# Patient Record
Sex: MALE
[De-identification: names, ages, dates, MRNs, and addresses within clinical notes are randomized per-mention and may not be internally consistent; named-entity substitution may affect disease eponyms.]

---
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
## 2020-02-03 ENCOUNTER — RX RENEWAL (OUTPATIENT)
Age: 20
End: 2020-02-03

## 2020-07-26 ENCOUNTER — RESULT CHARGE (OUTPATIENT)
Age: 20
End: 2020-07-26

## 2020-07-28 ENCOUNTER — APPOINTMENT (OUTPATIENT)
Dept: PEDIATRIC CARDIOLOGY | Facility: CLINIC | Age: 20
End: 2020-07-28
Payer: COMMERCIAL

## 2020-07-28 VITALS
RESPIRATION RATE: 22 BRPM | DIASTOLIC BLOOD PRESSURE: 68 MMHG | WEIGHT: 164.02 LBS | TEMPERATURE: 97.8 F | BODY MASS INDEX: 22.96 KG/M2 | HEART RATE: 73 BPM | HEIGHT: 70.79 IN | OXYGEN SATURATION: 99 % | SYSTOLIC BLOOD PRESSURE: 121 MMHG

## 2020-07-28 DIAGNOSIS — I42.9 CARDIOMYOPATHY, UNSPECIFIED: ICD-10-CM

## 2020-07-28 PROCEDURE — 99214 OFFICE O/P EST MOD 30 MIN: CPT | Mod: 95

## 2020-07-28 PROCEDURE — 93000 ELECTROCARDIOGRAM COMPLETE: CPT

## 2020-07-28 PROCEDURE — 93303 ECHO TRANSTHORACIC: CPT

## 2020-07-28 PROCEDURE — 93325 DOPPLER ECHO COLOR FLOW MAPG: CPT

## 2020-07-28 PROCEDURE — 93320 DOPPLER ECHO COMPLETE: CPT

## 2020-07-28 NOTE — CARDIOLOGY SUMMARY
[Today's Date] : [unfilled] [LVSF ___%] : LV Shortening Fraction [unfilled]% [FreeTextEntry1] : An electrocardiogram today shows a normal sinus rhythm (rate=76 bpm). There was a normal axis and normal ventricular forces.  Nonspecific ST-T wave changes were noted. There was no ectopy on the surface EKG. The measured intervals were normal. [de-identified] : December 13, 2017 [de-identified] : pending\par \par October 2, 2019: This study revealed a predominant normal sinus rhythm at a rate of  bpm. The average heart rate was 88 bpm. No supraventricular ectopy was noted.  1 PVC was noted which was not clinically significant.\par \par November 14, 2017: This study revealed a predominant normal sinus rhythm at a rate of  bpm. The average heart rate was 79 bpm. No supraventricular or ventricular ectopy was noted.\par \par August 16, 2016: This 24-hour Holter monitor revealed a predominant normal sinus rhythm with a heart rate range of  bpm with an average heart rate of 87 bpm. One isolated premature ventricular contraction was noted.\par  [FreeTextEntry2] : A two-dimensional echocardiogram with Doppler evaluation reveals mild hypokinesia of the ventricular septum. The left ventricular ejection fraction by the 5/6*A*L method was at the lower limits of normal at 55%. Left ventricular shortening fraction was at normal at 31%. There was no evidence of pulmonary hypertension. Diastolic assessment of LV performance was normal.  No pericardial effusion was seen. The left ventricular mass index and the LV mass, was at the 75th percentile. [de-identified] : October 2, 2019 [de-identified] : This cardiac MRI revealed normal left ventricular volumes and a low normal left ventricular ejection fraction of 57%. The right ventricular ejection fraction was normal at 58%. No regional wall abnormalities were seen. There was no fibrofatty infiltration or edema. No perfusion deficits were identified and there was no evidence of fibrosis on the late gadolinium enhancement sequences. [de-identified] : Pro-BNP – 7 (0-300),WNL\par CBC - WNL\par CMP was notable to mild elevations in AST (49), and ALT (99), seen in patients with muscular dystrophies. No significant change from the LFTs obtained in February of 2018.\par \par February 6, 2018:  CBC and pro-BNP - WNL\par CMP was notable to mild elevations in AST and ALT (seen in patients with muscular dystrophies)

## 2020-07-28 NOTE — REVIEW OF SYSTEMS
[Feeling Poorly] : not feeling poorly (malaise) [Fever] : no fever [Wgt Loss (___ Lbs)] : no recent weight loss [Pallor] : not pale [Eye Discharge] : no eye discharge [Redness] : no redness [Change in Vision] : no change in vision [Nasal Stuffiness] : no nasal congestion [Sore Throat] : no sore throat [Cyanosis] : no cyanosis [Loss Of Hearing] : no hearing loss [Earache] : no earache [Edema] : no edema [Diaphoresis] : not diaphoretic [Chest Pain] : no chest pain or discomfort [Palpitations] : no palpitations [Exercise Intolerance] : no persistence of exercise intolerance [Fast HR] : no tachycardia [Orthopnea] : no orthopnea [Tachypnea] : not tachypneic [Wheezing] : no wheezing [Cough] : no cough [Shortness Of Breath] : not expressed as feeling short of breath [Vomiting] : no vomiting [Diarrhea] : no diarrhea [Abdominal Pain] : no abdominal pain [Decrease In Appetite] : appetite not decreased [Fainting (Syncope)] : no fainting [Seizure] : no seizures [Headache] : no headache [Dizziness] : no dizziness [Limping] : no limping [Joint Pains] : no arthralgias [Joint Swelling] : no joint swelling [Rash] : no rash [Wound problems] : no wound problems [Easy Bruising] : no tendency for easy bruising [Easy Bleeding] : no ~M tendency for easy bleeding [Swollen Glands] : no lymphadenopathy [Nosebleeds] : no epistaxis [Hyperactive] : no hyperactive behavior [Sleep Disturbances] : ~T no sleep disturbances [Anxiety] : no anxiety [Depression] : no depression [Jitteriness] : no jitteriness [Short Stature] : short stature was not noted [Failure To Thrive] : no failure to thrive [Dec Urine Output] : no oliguria [Heat/Cold Intolerance] : no temperature intolerance

## 2020-07-28 NOTE — PHYSICAL EXAM
[General Appearance - Alert] : alert [General Appearance - Well Nourished] : well nourished [General Appearance - In No Acute Distress] : in no acute distress [General Appearance - Well-Appearing] : well appearing [General Appearance - Well Developed] : well developed [Attitude Uncooperative] : cooperative [Outer Ear] : the ears and nose were normal in appearance [Facies] : there were no dysmorphic facial features [Examination Of The Oral Cavity] : mucous membranes were moist and pink [] : no respiratory distress [Respiration, Rhythm And Depth] : normal respiratory rhythm and effort [Demonstrated Behavior - Infant Nonreactive To Parents] : interactive [No Cough] : no cough [Mood] : mood and affect were appropriate for age [Demonstrated Behavior] : normal behavior

## 2020-07-28 NOTE — HISTORY OF PRESENT ILLNESS
[Home] : at home, [unfilled] , at the time of the visit. [Medical Office: ([facility])___] : at the medical office located in  [Mother] : mother [Verbal consent obtained from patient] : the patient, [unfilled] [FreeTextEntry1] : Gadiel was evaluated at the cardiology office at the [facility] in [place] on July 28, 2020.  Gadiel is now an almost 20 year young man who is followed in our division with the diagnosis of Hinson's muscular dystrophy. His last cardiac evaluation was in October of 2019. Gadiel had vital signs, an EKG and two-dimensional echocardiogram with Doppler evaluation performed in the cardiology office this morning. I provided a consultation by telehealth later in the day. \par \par He was accompanied to the telehealth visit today by his mother. Of note, Gadiel's father has tested positive for antibodies to coronavirus 2 (SARS–CoV-2). Neither Gadiel, nor any of his other immediate family members, have had any signs or symptoms of COVID-19.  \par \par At the time of our last evaluation, Gadiel was complaining of difficulty breathing and an overwhelming sense of anxiety.  He tells me today that these symptoms have completely resolved and that he is overall much less anxious about his diagnosis of Hinson's muscular dystrophy. \par \par From a clinical perspective, he continues to have no signs or symptoms of congestive heart failure.  He denies complaints of chest pain, palpitations, dizziness, or syncope. He has no chronic cough. He describes his energy level as stable. He has the stamina to perform aerobic exercises, such as walking, biking, and swimming, without any difficulties. He has had no major intercurrent illnesses, hospitalizations or surgeries.\par \par At the time of his evaluation in November of 2017, Gadiel had evidence of mild hypokinesia of the posterior wall and septal ventricular wall. His left ventricular ejection fraction was mildly decreased at 52% compared to 62% the year prior. Gadiel also has had a cardiac MRI in December of 2017 which showed no evidence of a significant cardiomyopathy and a low normal left ventricular ejection fraction of 57%. He also has had a normal Holter monitor, in November of 2017. In February of 2018, Gadiel was started on lisinopril 10 mg once daily as a remodeling medication for his mild cardiomyopathy and for better blood pressure control. In February of 2019, the dose of lisinopril was increased to 20 mg once daily. He is tolerating this medication well with no adverse effects.\par \par Because of the concern of abnormal thyroid function studies, Gadiel has been evaluated by an adult endocrinologist. Followup thyroid function studies obtained on 1/09/2019, were found to be within normal limits. He was also found to have a borderline low vitamin D level, and is presently taking a daily multivitamin. He will followup with the adult endocrinologist.\par \par He was also evaluated by Dr. Stone in pediatric nephrology on 12/6/2017. Gadiel has had an 24-hour ambulatory blood pressure monitor. This study found him to be in the "pre-hypertensive" range. She recommended repeating this monitor in 1 - 2 years.\par \par He is followed closely by Dr. Kwabena Atwood of neurology for his muscular dystrophy. There has been no significant progression of his muscular dystrophy. His next visit with Dr. Atwood  is in the first week of September, 2020.  \par \par His past medical history is notable for multiple ENT procedures including an adenoidectomy. His medications include Lisinopril 20 mg once daily. He is on no other chronic medications. He has no known allergies and his immunizations are up to date.   He has completed his second year at [employer], and will continue onto the third year.  His education will be online during the coronavirus pandemic. A review of systems was otherwise unremarkable.\par \par Gadiel's mother is adopted and her family history is unknown. There is no other known family history of muscular dystrophy. Gadiel's 21 yr old brother does not have this diagnosis. He has a 22 yr. old sister who has not had genetic testing performed for BMD.\par \par Gadiel's 21-year-old brother, Inderjit, has had recurrent episodes of atrial fibrillation (first episode in February of 2016). [facility] to a normal sinus rhythm. He subsequently had an electrophysiological study at [facility] which was unremarkable. He is followed by an adult cardiologist. 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 Gadiel and his sister were both tested for homocystinuria and were found to be negative.

## 2020-07-28 NOTE — CONSULT LETTER
[Today's Date] : [unfilled] [Name] : Name: [unfilled] [] : : ~~ [Today's Date:] : [unfilled] [Dear  ___:] : Dear Dr. [unfilled]: [Consult] : I had the pleasure of evaluating your patient, [unfilled]. My full evaluation follows. [Consult - Single Provider] : Thank you very much for allowing me to participate in the care of this patient. If you have any questions, please do not hesitate to contact me. [Sincerely,] : Sincerely, [FreeTextEntry4] : Alan Mackey [FreeTextEntry6] : [place] [address] [FreeTextEntry5] : [address] [facility] [address] [de-identified] : Mercedes Peng MD\par Pediatric Cardiologist\par [facility]\par [address] [place] [address]\par [place], [place] [address]\par Phone: [phone]\par FAX: [phone]\par  [DrRoque  ___] : Dr. ROBERTSON

## 2020-07-28 NOTE — DISCUSSION/SUMMARY
[Influenza vaccine is recommended] : Influenza vaccine is recommended [There are no changes in medication management.] : There are no changes in medication management [Needs SBE Prophylaxis] : [unfilled] does not need bacterial endocarditis prophylaxis [FreeTextEntry1] : From a cardiac perspective, there are no specific restrictions with regard to exercise activities. He may participate in all age appropriate and neurologically appropriate activities.\par Aerobic activities are encouraged.

## 2021-02-10 ENCOUNTER — TRANSCRIPTION ENCOUNTER (OUTPATIENT)
Age: 21
End: 2021-02-10

## 2021-05-15 ENCOUNTER — RESULT CHARGE (OUTPATIENT)
Age: 21
End: 2021-05-15

## 2021-05-17 ENCOUNTER — APPOINTMENT (OUTPATIENT)
Dept: PEDIATRIC CARDIOLOGY | Facility: CLINIC | Age: 21
End: 2021-05-17
Payer: COMMERCIAL

## 2021-05-17 VITALS
HEIGHT: 70.87 IN | OXYGEN SATURATION: 98 % | WEIGHT: 184.97 LBS | DIASTOLIC BLOOD PRESSURE: 76 MMHG | HEART RATE: 89 BPM | BODY MASS INDEX: 25.9 KG/M2 | SYSTOLIC BLOOD PRESSURE: 121 MMHG

## 2021-05-17 PROCEDURE — 99072 ADDL SUPL MATRL&STAF TM PHE: CPT

## 2021-05-17 PROCEDURE — 93306 TTE W/DOPPLER COMPLETE: CPT

## 2021-05-17 PROCEDURE — 93000 ELECTROCARDIOGRAM COMPLETE: CPT

## 2021-05-17 PROCEDURE — 99214 OFFICE O/P EST MOD 30 MIN: CPT

## 2021-05-19 NOTE — HISTORY OF PRESENT ILLNESS
[FreeTextEntry1] : Gadiel was evaluated at the cardiology office at the [facility] on May 17, 2021.  Gadiel is now a 20 year young man who is followed in our division with the diagnosis of Hinson's muscular dystrophy. His last cardiac evaluation was on July 28, 2020.\par \par Gadiel was accompanied to the office visit today by his mother. Of note, Gadiel's father has tested positive for antibodies to coronavirus 2 (SARS–CoV-2) in the spring 2020.  In early February 2021, both Gadiel and his mother's mother had signs and symptoms of COVID-19.  They tested PCR positive for coronavirus 2 (SARS–CoV-2).  Gadiel described a very mild case of COVID-19.  He had low-grade fever and a "cold" for only 2 days, after which he has felt well.  We discussed the importance of receiving the COVID-19 vaccine.  I recommended that all family members receive the vaccine when they are ready.  \par \par From a clinical perspective, he continues to have no signs or symptoms of congestive heart failure.  He denies complaints of chest pain, palpitations, dizziness, or syncope. He has no chronic cough. He describes his energy level as stable. He has the stamina to perform aerobic exercises, such as walking, biking, without any difficulties.  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  He describes his work as being "very active".  He has had no major intercurrent illnesses, hospitalizations or surgeries.\par \par At the time of his evaluation in November of 2017, Gadiel had evidence of mild hypokinesia of the posterior wall and septal ventricular wall. His left ventricular ejection fraction was mildly decreased at 52% compared to 62% the year prior. Gadiel also has had a cardiac MRI in December of 2017 which showed no evidence of a significant cardiomyopathy and a low normal left ventricular ejection fraction of 57%. He also has had a normal Holter monitor, in November of 2017. In February of 2018, Gadiel was started on lisinopril 10 mg once daily as a remodeling medication for his mild cardiomyopathy and for better blood pressure control. In February of 2019, the dose of lisinopril was increased to 20 mg once daily. He is tolerating this medication well with no adverse effects.\par \par Because of the concern of abnormal thyroid function studies, Gadiel has been evaluated by an adult endocrinologist. Followup thyroid function studies obtained on 1/09/2019, were found to be within normal limits. He was also found to have a borderline low vitamin D level, and is presently taking a daily multivitamin. He will followup with the adult endocrinologist.  Recent blood work obtained in your pediatric office was notable for a normal TSH, and a low vitamin D level of 26.6 (30-80 ng/mL)\par \par He was also evaluated by Dr. Stone in pediatric nephrology on 12/6/2017. Gadiel has had an 24-hour ambulatory blood pressure monitor. This study found him to be in the "pre-hypertensive" range. She recommended repeating this monitor in 1 - 2 years.\par \par He is followed closely by Dr. Kwabena Atwood of neurology for his muscular dystrophy. There has been no significant progression of his muscular dystrophy. His next visit with Dr. Atwood  is in the summer of 2021.\par \par His past medical history is notable for multiple ENT procedures including an adenoidectomy. His medications include Lisinopril 20 mg once daily. He is on no other chronic medications. He has no known allergies and his immunizations are up to date.   He is in his third year at [employer].  His education has been online during the coronavirus pandemic. A review of systems was otherwise unremarkable.\par \par Gadiel's mother is adopted and her family history is unknown. There is no other known family history of muscular dystrophy. Gadiel's 21 yr old brother does not have this diagnosis. He has a 22 yr. old sister who has not had genetic testing performed for BMD.\par \par Gadiel's 21-year-old brother, Inderjit, has had recurrent episodes of atrial fibrillation (first episode in February of 2016). [facility] and cardioverted to a normal sinus rhythm. He subsequently had an electrophysiological study at [facility] which was unremarkable. He is followed by an adult cardiologist. 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 Gadiel and his sister were both tested for homocystinuria and were found to be negative.

## 2021-05-19 NOTE — PHYSICAL EXAM
[Apical Impulse] : quiet precordium with normal apical impulse [Heart Rate And Rhythm] : normal heart rate and rhythm [Heart Sounds] : normal S1 and S2 [No Murmur] : no murmurs  [Heart Sounds Gallop] : no gallops [Heart Sounds Pericardial Friction Rub] : no pericardial rub [Heart Sounds Click] : no clicks [Arterial Pulses] : normal upper and lower extremity pulses with no pulse delay [Edema] : no edema [Capillary Refill Test] : normal capillary refill [No Diastolic Murmur] : no diastolic murmur was heard [Cervical Lymph Nodes Enlarged Posterior] : The posterior cervical nodes were normal [Skin Lesions] : no lesions [Nail Clubbing] : no clubbing  or cyanosis of the fingers [General Appearance - In No Acute Distress] : in no acute distress [General Appearance - Alert] : alert [General Appearance - Well Nourished] : well nourished [General Appearance - Well Developed] : well developed [General Appearance - Well-Appearing] : well appearing [Attitude Uncooperative] : cooperative [Appearance Of Head] : the head was normocephalic [Facies] : there were no dysmorphic facial features [Sclera] : the conjunctiva were normal [Outer Ear] : the ears and nose were normal in appearance [Examination Of The Oral Cavity] : mucous membranes were moist and pink [Respiration, Rhythm And Depth] : normal respiratory rhythm and effort [Auscultation Breath Sounds / Voice Sounds] : breath sounds clear to auscultation bilaterally [No Cough] : no cough [Normal Chest Appearance] : the chest was normal in appearance [Chest Visual Inspection Thoracic Deformity] : no chest wall deformity [Bowel Sounds] : normal bowel sounds [Abdomen Soft] : soft [Abdomen Tenderness] : non-tender [] : no hepato-splenomegaly [Demonstrated Behavior - Infant Nonreactive To Parents] : interactive [Demonstrated Behavior] : normal behavior [Anxious] : anxious [FreeTextEntry1] : pseudohypertrophy of bilateral calf muscles and bilateral biceps; mild contractures of  Achiles tendon

## 2021-05-19 NOTE — CONSULT LETTER
[Today's Date] : [unfilled] [Name] : Name: [unfilled] [] : : ~~ [Today's Date:] : [unfilled] [Dear  ___:] : Dear Dr. [unfilled]: [Consult] : I had the pleasure of evaluating your patient, [unfilled]. My full evaluation follows. [Consult - Single Provider] : Thank you very much for allowing me to participate in the care of this patient. If you have any questions, please do not hesitate to contact me. [Sincerely,] : Sincerely, [DrRoque  ___] : Dr. ROBERTSON [FreeTextEntry4] : Dr.Hafiz Mackey [FreeTextEntry5] : [address] [facility] [address] [FreeTextEntry6] : [place] [address] [de-identified] : Mercedes Peng MD\par Pediatric Cardiologist\par [facility]\par [address] [place] [address]\par [place], [place] [address]\par Phone: [phone]\par FAX: [phone]\par

## 2021-05-19 NOTE — CARDIOLOGY SUMMARY
[LVSF ___%] : LV Shortening Fraction [unfilled]% [Today's Date] : [unfilled] [FreeTextEntry1] : An electrocardiogram today shows a normal sinus rhythm (rate=80 bpm). There was a normal axis and prominent left ventricular ventricular forces. There was no ectopy on the surface EKG. The measured intervals were normal. [FreeTextEntry2] : A two-dimensional echocardiogram with Doppler evaluation reveals mild hypokinesia of the ventricular septum. The left ventricular ejection fraction by the 5/6*A*L method was normal at 58%. Left ventricular shortening fraction was at normal at 34%. There was no evidence of pulmonary hypertension. Diastolic assessment of LV performance was normal.  No pericardial effusion was seen. The left ventricular mass index and the LV mass, was at the 75th percentile (age specific; indexed LV mass).  Global longitudinal left ventricular strain was normal at -21.9 (-18.3  to -23.5) [de-identified] : pending\par \par July 28, 2020: This study revealed a predominant normal sinus rhythm at a rate of  bpm. The average heart rate was 84 bpm. One supraventricular ectopic beat was noted.  No PVCs were noted.\par \par October 2, 2019: This study revealed a predominant normal sinus rhythm at a rate of  bpm. The average heart rate was 88 bpm. No supraventricular ectopy was noted.  1 PVC was noted which was not clinically significant.\par \par November 14, 2017: This study revealed a predominant normal sinus rhythm at a rate of  bpm. The average heart rate was 79 bpm. No supraventricular or ventricular ectopy was noted.\par \par August 16, 2016: This 24-hour Holter monitor revealed a predominant normal sinus rhythm with a heart rate range of  bpm with an average heart rate of 87 bpm. One isolated premature ventricular contraction was noted.\par  [de-identified] : December 13, 2017 [de-identified] : This cardiac MRI revealed normal left ventricular volumes and a low normal left ventricular ejection fraction of 57%. The right ventricular ejection fraction was normal at 58%. No regional wall abnormalities were seen. There was no fibrofatty infiltration or edema. No perfusion deficits were identified and there was no evidence of fibrosis on the late gadolinium enhancement sequences. [de-identified] : Mid February, 2020 [de-identified] : CBC - WNL\par CMP was notable to mild elevations in AST (64), and ALT (70), seen in patients with muscular dystrophies. No significant change from the LFTs obtained in February of 2018, or October of 2019.  Normal renal function tests.\par Normal lipid profile.  Hemoglobin A1c of 4.7 (within normal limits).  Normal TSH.\par Vitamin D level was 26.6 (reference range 30-80).\par \par October 2, 2019:  Pro-BNP – 7 (0-300),WNL\par CBC - WNL\par CMP was notable to mild elevations in AST (49), and ALT (99), seen in patients with muscular dystrophies. No significant change from the LFTs obtained in February of 2018.\par \par February 6, 2018:  CBC and pro-BNP - WNL\par CMP was notable to mild elevations in AST and ALT (seen in patients with muscular dystrophies)

## 2021-05-19 NOTE — REASON FOR VISIT
[Follow-Up] : a follow-up visit for [Noncardiac Disease] : cardiovascular evaluation  [Patient] : patient [Mother] : mother [FreeTextEntry1] : Hinson's Muscular dystrophy

## 2021-05-19 NOTE — DISCUSSION/SUMMARY
[Influenza vaccine is recommended] : Influenza vaccine is recommended [There are no changes in medication management.] : There are no changes in medication management [Needs SBE Prophylaxis] : [unfilled] does not need bacterial endocarditis prophylaxis [FreeTextEntry1] : In summary, Gadiel's cardiac evaluation today has remained quite stable since his last evaluation in October of 2019. He manifests no overt signs or symptoms of congestive heart failure. He is normotensive. \par \par In the past, we have documented no concerning arrhythmias.  For completeness, a 24-hour Holter monitor was also placed and is currently pending.  At the time of our next evaluation, routine laboratory tests such as a complete blood count, and a comprehensive metabolic profile will be obtained.  A pro-BNP will also be obtained.\par \par Gadiel has Hinson's muscular dystrophy. Hinson's muscular dystrophy is an X-linked recessive disorder involving mutations of the dystrophin gene. The frequency of cardiac involvement is 60-75%. The average age of onset of the cardiac involvement is in the 20's. The primary pathology of the cardiomyopathy in Hinson's muscular dystrophy is thought to be due to diffuse degeneration and fibrosis in the ventricles, especially in the inferolateral region and the conduction tissue. Most patients with Hinson's muscular dystrophy have asymptomatic cardiac involvement. Also, there appears to be no correlation between skeletal muscle involvement and the severity or time of onset of myocardial involvement.\par \par On Gadiel 's previous cardiac evaluations, he was noted to be in the "prehypertensive" range. Also in the fall of 2017, he was noted to have a decrease in his left ventricular ejection fraction to approximately 52%. On his cardiac MRI in December of 2017, his left ventricular ejection fraction was low normal at 57%. For these reasons, in February of 2018, Gadiel was started on therapy with lisinopril 10 mg once daily. In February of 2019, the dose of lisinopril was increased to 20 mg once daily. He has been compliant with this medication.Therapy with an angiotensin-converting enzyme inhibitor, such as lisinopril, has been shown to delay the progression of LV dysfunction, improve left ventricular function and confer a mortality benefit. It also has the added benefit benefit of maintaining normotension. I emphasized the importance of excellent hydration throughout the course of the day, particularly during the warm summer months. His EKG has improved with decreased left ventricular forces, compared with February of 2019. Today, his left ventricular ejection fraction by the 5/6*A*L method was 58% (within the limits of normal).  Assessment of left ventricular strain was also performed on today's echocardiogram and was found to be within normal limits.  Also, Gadiel was normotensive today.\par \par To date, we have documented no concerning arrhythmias. A 24-hour Holter monitor was placed for cardiac rhythm surveillance, and is pending.\par \par Gadiel had a cardiac MRI/MRA on December 13, 2017.  I would recommend that he return in the near future for a follow-up cardiac MRI/MRA.  The purpose of this study would be to assess, via a different/accurate modality, his left ventricular ejection fraction, as well as to screen for any regional wall abnormalities, or evidence of fibrosis or perfusion deficits.  Gadiel and his mother are in agreement with this recommendation.\par \par From a cardiac perspective, there are no specific restrictions with regard to exercise activities. He may participate in all age appropriate and neurologically appropriate activities.\par \par I have again advised that the family be evaluated by in genetics with regard to the inheritance of X-linked Hinson's muscular dystrophy. Gadiel's older sister should have genetic testing performed in order to determine whether she is a carrier of Hinson's muscular dystrophy.\par \par I discussed the above information with Gadiel and his mother and all of their questions were answered. Gadiel should be seen in pediatric cardiology followup in approximately 6 - 8 months time, or sooner, if clinically indicated.\par \par Addendum: On 5/19/2021, I called Gadiel's mother and discussed the lab results (blood work) which I received from the pediatric office.  These labs were performed in mid February 2020.  I noted that the vitamin D level was low and therefore recommended that she speak to her pediatrician and to Kwabena Atwood in neurology regarding vitamin D supplements for Gadiel.  His liver function tests were minimally elevated and consistent with his diagnosis of muscular dystrophy.  The remainder of his blood work was normal.  She expressed understanding.

## 2021-07-29 ENCOUNTER — TRANSCRIPTION ENCOUNTER (OUTPATIENT)
Age: 21
End: 2021-07-29

## 2021-08-25 ENCOUNTER — NON-APPOINTMENT (OUTPATIENT)
Age: 21
End: 2021-08-25

## 2021-08-26 ENCOUNTER — APPOINTMENT (OUTPATIENT)
Dept: MRI IMAGING | Facility: HOSPITAL | Age: 21
End: 2021-08-26

## 2021-08-26 ENCOUNTER — OUTPATIENT (OUTPATIENT)
Dept: OUTPATIENT SERVICES | Age: 21
LOS: 1 days | End: 2021-08-26

## 2021-08-26 ENCOUNTER — RESULT REVIEW (OUTPATIENT)
Age: 21
End: 2021-08-26

## 2021-08-26 DIAGNOSIS — G71.01 DUCHENNE OR BECKER MUSCULAR DYSTROPHY: ICD-10-CM

## 2021-08-27 ENCOUNTER — NON-APPOINTMENT (OUTPATIENT)
Age: 21
End: 2021-08-27

## 2021-09-26 ENCOUNTER — TRANSCRIPTION ENCOUNTER (OUTPATIENT)
Age: 21
End: 2021-09-26

## 2022-05-02 ENCOUNTER — APPOINTMENT (OUTPATIENT)
Dept: PEDIATRIC CARDIOLOGY | Facility: CLINIC | Age: 22
End: 2022-05-02

## 2022-05-09 ENCOUNTER — APPOINTMENT (OUTPATIENT)
Dept: PEDIATRIC CARDIOLOGY | Facility: CLINIC | Age: 22
End: 2022-05-09

## 2022-07-25 ENCOUNTER — APPOINTMENT (OUTPATIENT)
Dept: PEDIATRIC CARDIOLOGY | Facility: CLINIC | Age: 22
End: 2022-07-25

## 2022-07-25 VITALS
OXYGEN SATURATION: 99 % | HEART RATE: 85 BPM | SYSTOLIC BLOOD PRESSURE: 128 MMHG | WEIGHT: 189.16 LBS | BODY MASS INDEX: 26.48 KG/M2 | DIASTOLIC BLOOD PRESSURE: 81 MMHG | HEIGHT: 70.87 IN

## 2022-07-25 PROCEDURE — 93306 TTE W/DOPPLER COMPLETE: CPT

## 2022-07-25 PROCEDURE — 93000 ELECTROCARDIOGRAM COMPLETE: CPT

## 2022-07-25 PROCEDURE — 99214 OFFICE O/P EST MOD 30 MIN: CPT | Mod: 25

## 2022-07-25 RX ORDER — DAPSONE 50 MG/G
5 GEL TOPICAL
Qty: 60 | Refills: 0 | Status: DISCONTINUED | COMMUNITY
Start: 2022-02-21

## 2022-07-25 NOTE — REASON FOR VISIT
[Follow-Up] : a follow-up visit for [Patient] : patient [Noncardiac Disease] : cardiovascular evaluation  [Hinson Muscular Dystrophy] : in the setting of Hinson Muscular Dystrophy [Mother] : mother

## 2022-07-28 NOTE — DISCUSSION/SUMMARY
[Influenza vaccine is recommended] : Influenza vaccine is recommended [There are no changes in medication management.] : There are no changes in medication management [Needs SBE Prophylaxis] : [unfilled] does not need bacterial endocarditis prophylaxis [FreeTextEntry1] : In summary, Gadiel's cardiac evaluation today has remained quite stable since his last evaluation.He manifests no overt signs or symptoms of congestive heart failure. He is normotensive. \par \par In the past, we have documented no concerning arrhythmias.  For completeness, a 24-hour Holter monitor was placed and is currently pending.  \par \par Gadiel has Hinson's muscular dystrophy. Hinson's muscular dystrophy is an X-linked recessive disorder involving mutations of the dystrophin gene. The frequency of cardiac involvement is 60-75%. The average age of onset of the cardiac involvement is in the 20's. The primary pathology of the cardiomyopathy in Hinson's muscular dystrophy is thought to be due to diffuse degeneration and fibrosis in the ventricles, especially in the inferolateral region and the conduction tissue. Most patients with Hinson's muscular dystrophy have asymptomatic cardiac involvement. Also, there appears to be no correlation between skeletal muscle involvement and the severity or time of onset of myocardial involvement. \par \par Gadiel's most recent cardiac MRI's performed in August 2021 revealed normal biventricular ejection fractions with no evidence of fibrofatty infiltration or edema.  There was no evidence of fibrosis or perfusion deficits.\par \par On Gadiel 's previous cardiac evaluations, he was noted to be in the "prehypertensive" range. Also in the fall of 2017, he was noted to have a decrease in his left ventricular ejection fraction to approximately 52%. On his cardiac MRI in December of 2017, his left ventricular ejection fraction was low normal at 57%. For these reasons, in February of 2018, Gadiel was started on therapy with lisinopril 10 mg once daily. In February of 2019, the dose of lisinopril was increased to 20 mg once daily. He has been compliant with this medication.Therapy with an angiotensin-converting enzyme inhibitor, such as lisinopril, has been shown to delay the progression of LV dysfunction, improve left ventricular function and confer a mortality benefit. It also has the added benefit benefit of maintaining normotension. I emphasized the importance of excellent hydration throughout the course of the day, particularly during the warm summer months. His EKG has improved with decreased left ventricular forces, compared with February of 2019. Today, his left ventricular ejection fraction by the 5/6*A*L method was 58% (within the limits of normal).  Also, Gadiel was normotensive today.\par \par To date, we have documented no concerning arrhythmias. A 24-hour Holter monitor was placed for cardiac rhythm surveillance, and is pending.\par \par From a cardiac perspective, there are no specific restrictions with regard to exercise activities. He may participate in all age appropriate and neurologically appropriate activities.\par \par I have again advised that the family be evaluated by in genetics with regard to the inheritance of X-linked Hinson's muscular dystrophy. Gadiel's older sister should have genetic testing performed in order to determine whether she is a carrier of Hinson's muscular dystrophy.\par \par I discussed the above information with Gadiel and his mother and all of their questions were answered. Gadiel should be seen in pediatric cardiology followup in approximately 8 months time, or sooner, if clinically indicated.

## 2022-07-28 NOTE — HISTORY OF PRESENT ILLNESS
[FreeTextEntry1] : Gadiel was evaluated at the cardiology office at the [facility] on July 25, 2022.  Gadiel is now an almost 22 year young man who is followed in our division with the diagnosis of Hinson's muscular dystrophy. His last cardiac evaluation was on May 17, 2021.\par \par Gadiel was accompanied to the office visit today by his mother.  Gadiel has received the COVID-19 vaccine.  I have encouraged him to receive the COVID-19 booster.\par \par From a clinical perspective, he continues to have no signs or symptoms of congestive heart failure.  He denies complaints of chest pain, palpitations, dizziness, or syncope. He has no chronic cough. He describes his energy level as stable. He has the stamina to perform aerobic exercises, such as walking, biking, without any difficulties.  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  He describes his work as being "very active" and physical.  He has no trouble meeting the demands of his job.  He has had no major intercurrent illnesses, hospitalizations or surgeries.\par \par At the time of his evaluation in November of 2017, Gadiel had evidence of mild hypokinesia of the posterior wall and septal ventricular wall. His left ventricular ejection fraction was mildly decreased at 52% compared to 62% the year prior. Gadiel also has had a cardiac MRI in December of 2017 which showed no evidence of a significant cardiomyopathy and a low normal left ventricular ejection fraction of 57%. He also has had a normal Holter monitor, in November of 2017. In February of 2018, Gadiel was started on lisinopril 10 mg once daily as a remodeling medication for his mild cardiomyopathy and for better blood pressure control. In February of 2019, the dose of lisinopril was increased to 20 mg once daily. He is tolerating this medication well with no adverse effects.\par \par Because of the concern of abnormal thyroid function studies, Gadiel has been evaluated by an adult endocrinologist. Followup thyroid function studies obtained on 1/09/2019, were found to be within normal limits. He was also found to have a borderline low vitamin D level, and is presently taking a daily multivitamin. He will followup with the adult endocrinologist.  Recent blood work obtained in your pediatric office was notable for a normal TSH, and a low vitamin D level of 26.6 (30-80 ng/mL)\par \par He was also evaluated by Dr. Stone in pediatric nephrology on 12/6/2017. Gadiel has had an 24-hour ambulatory blood pressure monitor. This study found him to be in the "pre-hypertensive" range. She recommended repeating this monitor in 1 - 2 years.\par \par In the past, Gadiel had been followed closely by Dr. Kwabena Atwood of neurology for his muscular dystrophy. Recently, his neurological care has been transitioned to the muscular dystrophy clinic at [facility], under the care of Dr. Michael Salcido. There has been no significant progression of his muscular dystrophy. \par \par His medications include Lisinopril 20 mg once daily. He is on no other chronic medications. He has no known allergies and his immunizations are up to date.  He is in his last year at [employer].  His past medical history is notable for multiple ENT procedures including an adenoidectomy. A review of systems was otherwise unremarkable.\par \par Gadiel's mother is adopted and her family history is unknown. There is no other known family history of muscular dystrophy. Gadiel's 21 yr old brother does not have this diagnosis. He has a 22 yr. old sister who has not had genetic testing performed for BMD.\par \par Gadiel's 21-year-old brother, Inderjit, has had recurrent episodes of atrial fibrillation (first episode in February of 2016). [facility] and cardioverted to a normal sinus rhythm. He subsequently had an electrophysiological study at  which was unremarkable. He is followed by an adult cardiologist. 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 Gadiel and his sister were both tested for homocystinuria and were found to be negative.

## 2022-07-28 NOTE — PHYSICAL EXAM
[Apical Impulse] : quiet precordium with normal apical impulse [Heart Rate And Rhythm] : normal heart rate and rhythm [Heart Sounds] : normal S1 and S2 [No Murmur] : no murmurs  [Heart Sounds Gallop] : no gallops [Heart Sounds Pericardial Friction Rub] : no pericardial rub [Heart Sounds Click] : no clicks [Arterial Pulses] : normal upper and lower extremity pulses with no pulse delay [Edema] : no edema [Capillary Refill Test] : normal capillary refill [No Diastolic Murmur] : no diastolic murmur was heard [Skin Lesions] : no lesions [Nail Clubbing] : no clubbing  or cyanosis of the fingers [General Appearance - Alert] : alert [General Appearance - In No Acute Distress] : in no acute distress [General Appearance - Well Nourished] : well nourished [General Appearance - Well Developed] : well developed [General Appearance - Well-Appearing] : well appearing [Attitude Uncooperative] : cooperative [Appearance Of Head] : the head was normocephalic [Facies] : there were no dysmorphic facial features [Sclera] : the conjunctiva were normal [Respiration, Rhythm And Depth] : normal respiratory rhythm and effort [Auscultation Breath Sounds / Voice Sounds] : breath sounds clear to auscultation bilaterally [No Cough] : no cough [Normal Chest Appearance] : the chest was normal in appearance [Chest Visual Inspection Thoracic Deformity] : no chest wall deformity [Abdomen Soft] : soft [Abdomen Tenderness] : non-tender [] : no hepato-splenomegaly [Demonstrated Behavior - Infant Nonreactive To Parents] : interactive [Demonstrated Behavior] : normal behavior [Appropriate] : appropriate [FreeTextEntry1] : Delightful, engaging young man

## 2022-07-28 NOTE — CONSULT LETTER
[Today's Date] : [unfilled] [Name] : Name: [unfilled] [] : : ~~ [Today's Date:] : [unfilled] [Dear  ___:] : Dear Dr. [unfilled]: [Consult] : I had the pleasure of evaluating your patient, [unfilled]. My full evaluation follows. [Consult - Single Provider] : Thank you very much for allowing me to participate in the care of this patient. If you have any questions, please do not hesitate to contact me. [Sincerely,] : Sincerely, [___] : [unfilled] [FreeTextEntry4] : Alan Mackey [FreeTextEntry5] : [address] [FreeTextEntry6] : [place] [address] [de-identified] : Mercedes Peng MD\par Pediatric Cardiologist\par [facility]\par [address] [place] [address]\par [place], [place] [address]\par Phone: [phone]\par FAX: [phone]\par

## 2022-07-28 NOTE — CARDIOLOGY SUMMARY
[LVSF ___%] : LV Shortening Fraction [unfilled]% [Today's Date] : [unfilled] [FreeTextEntry1] : An electrocardiogram today shows a normal sinus rhythm (rate=72 bpm). There was a normal axis and prominent left ventricular ventricular forces. There was no ectopy on the surface EKG. The measured intervals were normal. [FreeTextEntry2] : A two-dimensional echocardiogram with Doppler evaluation reveals mild hypokinesia of the ventricular septum. The left ventricular ejection fraction by the 5/6*A*L method was normal at 58%. Left ventricular shortening fraction was at normal at 30%. There was no evidence of pulmonary hypertension. Diastolic assessment of LV performance was normal.  No pericardial effusion was seen.  No interval change. [de-identified] : .  May 17, 2021 [de-identified] : pending\par \par May 17, 2021: This study revealed a predominant normal sinus rhythm at a rate of  bpm. The average heart rate was 82 bpm. One supraventricular ectopic beat was noted. No PVCs were noted.  \par \par July 28, 2020: This study revealed a predominant normal sinus rhythm at a rate of  bpm. The average heart rate was 84 bpm. One supraventricular ectopic beat was noted.  No PVCs were noted.\par \par October 2, 2019: This study revealed a predominant normal sinus rhythm at a rate of  bpm. The average heart rate was 88 bpm. No supraventricular ectopy was noted.  1 PVC was noted which was not clinically significant.\par \par November 14, 2017: This study revealed a predominant normal sinus rhythm at a rate of  bpm. The average heart rate was 79 bpm. No supraventricular or ventricular ectopy was noted.\par \par August 16, 2016: This 24-hour Holter monitor revealed a predominant normal sinus rhythm with a heart rate range of  bpm with an average heart rate of 87 bpm. One isolated premature ventricular contraction was noted.\par  [de-identified] : August 26, 2021 [de-identified] : This cardiac MRI revealed normal left ventricular volumes and a normal left ventricular ejection fraction of 58%. The right ventricular ejection fraction was normal at 61%.  The left ventricular mass was normal.  No regional wall abnormalities were seen. There was no fibrofatty infiltration or edema. No perfusion deficits were identified and there was no evidence of fibrosis on the late gadolinium enhancement sequences. \par \par December 13, 2017:  This cardiac MRI revealed normal left ventricular volumes and a low normal left ventricular ejection fraction of 57%. The right ventricular ejection fraction was normal at 58%. No regional wall abnormalities were seen. There was no fibrofatty infiltration or edema. No perfusion deficits were identified and there was no evidence of fibrosis on the late gadolinium enhancement sequences. [de-identified] : Mid February, 2020 [de-identified] : CBC - WNL\par CMP was notable to mild elevations in AST (64), and ALT (70), seen in patients with muscular dystrophies. No significant change from the LFTs obtained in February of 2018, or October of 2019.  Normal renal function tests.\par Normal lipid profile.  Hemoglobin A1c of 4.7 (within normal limits).  Normal TSH.\par Vitamin D level was 26.6 (reference range 30-80).\par \par October 2, 2019:  Pro-BNP – 7 (0-300),WNL\par CBC - WNL\par CMP was notable to mild elevations in AST (49), and ALT (99), seen in patients with muscular dystrophies. No significant change from the LFTs obtained in February of 2018.\par \par February 6, 2018:  CBC and pro-BNP - WNL\par CMP was notable to mild elevations in AST and ALT (seen in patients with muscular dystrophies)

## 2023-01-04 ENCOUNTER — RX RENEWAL (OUTPATIENT)
Age: 23
End: 2023-01-04

## 2023-03-27 ENCOUNTER — OFFICE (OUTPATIENT)
Dept: URBAN - METROPOLITAN AREA CLINIC 115 | Facility: CLINIC | Age: 23
Setting detail: OPHTHALMOLOGY
End: 2023-03-27
Payer: COMMERCIAL

## 2023-03-27 DIAGNOSIS — H52.13: ICD-10-CM

## 2023-03-27 DIAGNOSIS — H50.52: ICD-10-CM

## 2023-03-27 PROCEDURE — 92014 COMPRE OPH EXAM EST PT 1/>: CPT | Performed by: OPHTHALMOLOGY

## 2023-03-27 ASSESSMENT — REFRACTION_CURRENTRX
OD_OVR_VA: 20/
OS_CYLINDER: 0.00
OD_CYLINDER: -0.50
OD_VPRISM_DIRECTION: SV
OS_SPHERE: -0.75
OD_SPHERE: -0.75
OS_AXIS: 180
OD_AXIS: 007
OS_OVR_VA: 20/
OS_VPRISM_DIRECTION: SV

## 2023-03-27 ASSESSMENT — SPHEQUIV_DERIVED
OS_SPHEQUIV: -0.75
OD_SPHEQUIV: -0.875
OS_SPHEQUIV: -0.375
OD_SPHEQUIV: -1

## 2023-03-27 ASSESSMENT — REFRACTION_AUTOREFRACTION
OD_AXIS: 174
OS_AXIS: 000
OD_CYLINDER: -0.75
OS_SPHERE: -0.75
OD_SPHERE: -0.50
OS_CYLINDER: 0.00

## 2023-03-27 ASSESSMENT — VISUAL ACUITY
OD_BCVA: 20/20
OS_BCVA: 20/20

## 2023-03-27 ASSESSMENT — REFRACTION_MANIFEST
OD_AXIS: 068
OS_SPHERE: -0.75
OD_VA1: 20/20
OS_VA1: 20/20
OD_SPHERE: PLANO
OS_VA1: 20/20
OD_SPHERE: -0.75
OS_CYLINDER: -0.25
OS_AXIS: 078
OD_VA1: 20/20
OD_AXIS: 180
OD_CYLINDER: -0.50
OS_SPHERE: -0.25
OD_CYLINDER: -0.25

## 2023-03-27 ASSESSMENT — LID EXAM ASSESSMENTS
OD_BLEPHARITIS: RUL T
OS_BLEPHARITIS: LUL T

## 2023-03-27 ASSESSMENT — TONOMETRY
OD_IOP_MMHG: 11
OS_IOP_MMHG: 13

## 2023-05-05 ENCOUNTER — RESULT CHARGE (OUTPATIENT)
Age: 23
End: 2023-05-05

## 2023-05-08 ENCOUNTER — APPOINTMENT (OUTPATIENT)
Dept: PEDIATRIC CARDIOLOGY | Facility: CLINIC | Age: 23
End: 2023-05-08
Payer: COMMERCIAL

## 2023-05-08 VITALS
HEART RATE: 87 BPM | DIASTOLIC BLOOD PRESSURE: 78 MMHG | SYSTOLIC BLOOD PRESSURE: 126 MMHG | BODY MASS INDEX: 27.53 KG/M2 | WEIGHT: 196.65 LBS | HEIGHT: 71.06 IN | OXYGEN SATURATION: 99 %

## 2023-05-08 DIAGNOSIS — G71.01 DUCHENNE OR BECKER MUSCULAR DYSTROPHY: ICD-10-CM

## 2023-05-08 DIAGNOSIS — Z13.6 ENCOUNTER FOR SCREENING FOR CARDIOVASCULAR DISORDERS: ICD-10-CM

## 2023-05-08 DIAGNOSIS — Z91.89 OTHER SPECIFIED PERSONAL RISK FACTORS, NOT ELSEWHERE CLASSIFIED: ICD-10-CM

## 2023-05-08 DIAGNOSIS — R03.0 ELEVATED BLOOD-PRESSURE READING, W/OUT DIAGNOSIS OF HYPERTENSION: ICD-10-CM

## 2023-05-08 PROCEDURE — 99214 OFFICE O/P EST MOD 30 MIN: CPT

## 2023-05-08 PROCEDURE — 93303 ECHO TRANSTHORACIC: CPT

## 2023-05-08 PROCEDURE — 93000 ELECTROCARDIOGRAM COMPLETE: CPT

## 2023-05-08 PROCEDURE — 93325 DOPPLER ECHO COLOR FLOW MAPG: CPT

## 2023-05-08 PROCEDURE — 93320 DOPPLER ECHO COMPLETE: CPT

## 2023-05-09 RX ORDER — LISINOPRIL 20 MG/1
20 TABLET ORAL
Qty: 90 | Refills: 2 | Status: ACTIVE | COMMUNITY
Start: 2018-02-06 | End: 1900-01-01

## 2023-05-10 NOTE — DISCUSSION/SUMMARY
[Influenza vaccine is recommended] : Influenza vaccine is recommended [There are no changes in medication management.] : There are no changes in medication management [Needs SBE Prophylaxis] : [unfilled] does not need bacterial endocarditis prophylaxis [FreeTextEntry1] : In summary, Gadiel's cardiac evaluation today has remained quite stable since his last evaluation.He manifests no overt signs or symptoms of congestive heart failure. He is normotensive. \par \par In the past, we have documented no concerning arrhythmias.  For completeness, a 24-hour Holter monitor was placed and is currently pending.  \par \par Gadiel has Hinson's muscular dystrophy. Hinson's muscular dystrophy is an X-linked recessive disorder involving mutations of the dystrophin gene. The frequency of cardiac involvement is 60-75%. The average age of onset of the cardiac involvement is in the 20's. The primary pathology of the cardiomyopathy in Hinson's muscular dystrophy is thought to be due to diffuse degeneration and fibrosis in the ventricles, especially in the inferolateral region, and the conduction tissue. Most patients with Hinson's muscular dystrophy have asymptomatic cardiac involvement. Also, there appears to be no correlation between skeletal muscle involvement and the severity or time of onset of myocardial involvement. \par \par Gadiel had a cardiac MRI's performed in August 2021 which revealed normal biventricular ejection fractions with no evidence of fibrofatty infiltration or edema.  There was no evidence of fibrosis or perfusion deficits.\par \par On Gadiel 's previous cardiac evaluations, he was noted to be in the "prehypertensive" range. Also in the fall of 2017, he was noted to have a decrease in his left ventricular ejection fraction to approximately 52%. On his cardiac MRI in December of 2017, his left ventricular ejection fraction was low normal at 57%. For these reasons, in February of 2018, Gadiel was started on therapy with lisinopril 10 mg once daily. In February of 2019, the dose of lisinopril was increased to 20 mg once daily. He has been compliant with this medication.Therapy with an angiotensin-converting enzyme inhibitor, such as lisinopril, has been shown to delay the progression of LV dysfunction, improve left ventricular function and confer a mortality benefit. It also has the added benefit benefit of maintaining normotension. I emphasized the importance of excellent hydration throughout the course of the day, particularly during the warm summer months. Today, his left ventricular ejection fraction by the 5/6*A*L method was 58% (within the limits of normal and stable).  Also, Gadiel was normotensive today - 117/76.  \par \par Gadiel should remain on lisinopril 20 mg once daily.\par \par To date, we have documented no concerning arrhythmias. A 24-hour Holter monitor was placed for cardiac rhythm surveillance, and is pending.\par \par From a cardiac perspective, there are no specific restrictions with regard to exercise activities. He may participate in all age appropriate and neurologically appropriate activities.\par \par I have again advised that the family be evaluated by in genetics with regard to the inheritance of X-linked Hinson's muscular dystrophy. Gadiel's older sister should have genetic testing performed in order to determine whether she is a carrier of Hinson's muscular dystrophy.\par \par I discussed the above information with Gadiel and his mother and all of their questions were answered to the best of my abilities.  Given Gadiel's age, I discussed with him and his mother the importance of transitioning his cardiology care to our "adult congenital heart disease program" (Dr. Renee Maurer and Marquita Nash, [facility]).  The family was provided with the contact information to schedule this follow-up appointment in approximately 6 months time, or sooner if clinically indicated.  They should of course continue to follow in the muscular dystrophy clinic at [facility].

## 2023-05-10 NOTE — HISTORY OF PRESENT ILLNESS
[FreeTextEntry1] : Gadiel was evaluated at the cardiology office at the [facility] on May 8, 2023.  Gadiel is now a 22 1/2 year young man who is followed in our division with the diagnosis of Hinson's muscular dystrophy. His last cardiac evaluation was on July 25, 2022.\par \par Gadiel was accompanied to the office visit today by his mother.  \par \par From a clinical perspective, he continues to have no signs or symptoms of congestive heart failure.  He denies complaints of chest pain, palpitations, dizziness, or syncope. He has no chronic cough. He describes his energy level as stable. He has the stamina to perform aerobic exercises, such as walking, biking, without any difficulties.  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  He describes his work as being "very active" and physical.  He has no trouble meeting the demands of his job.  He also plays golf several days a week.  He has had no major intercurrent illnesses, hospitalizations or surgeries.\par \par At the time of his evaluation in November of 2017, Gadiel had evidence of mild hypokinesia of the posterior wall and septal ventricular wall. His left ventricular ejection fraction was mildly decreased at 52% compared to 62% the year prior. Gadiel also has had a cardiac MRI in December of 2017 which showed no evidence of a significant cardiomyopathy and a low normal left ventricular ejection fraction of 57%. He has had a normal Holter monitor, in November of 2017. In February of 2018, Gadiel was started on lisinopril 10 mg once daily as a remodeling medication for his mild cardiomyopathy and for better blood pressure control. In February of 2019, the dose of lisinopril was increased to 20 mg once daily. He is tolerating this medication well with no adverse effects.\par \par Because of the concern of abnormal thyroid function studies, Gadiel has been evaluated by an adult endocrinologist. Followup thyroid function studies obtained on 1/09/2019, were found to be within normal limits. He was also found to have a borderline low vitamin D level, and had been taking a daily multivitamin. He will followup with the adult endocrinologist.  \par \par He was evaluated by Dr. Stone in pediatric nephrology on 12/6/2017. Gadiel had an 24-hour ambulatory blood pressure monitor. This study found him to be in the "pre-hypertensive" range.  Gadiel has been maintained on lisinopril with acceptable blood pressures.\par \par In the past, Gadiel had been followed closely by Dr. Kwabena Atwood of neurology for his muscular dystrophy.  In the spring 2022,, his neurological care was transitioned to the muscular dystrophy clinic at [facility], under the care of Dr. Michael Salcido. He has a follow-up visit in this muscular dystrophy clinic in June 2023.  There has been no significant progression of his muscular dystrophy. \par \par His medications include Lisinopril 20 mg once daily. He is on no other chronic medications. He has no known allergies and his immunizations are up to date.  Gadiel has received the COVID-19 vaccine. He will be graduating from [employer] this month with a degree in communications.  His past medical history is notable for multiple ENT procedures including an adenoidectomy. A review of systems was otherwise unremarkable.\par \par Gadiel's mother is adopted and her family history is unknown. There is no other known family history of muscular dystrophy. Gadiel's 22 yr old brother does not have this diagnosis. He has a 22 yr. old sister who has not had genetic testing performed for BMD.\par \par Gadiel's 22-year-old brother, Inderjit, has had recurrent episodes of atrial fibrillation (first episode in February of 2016). [facility] and cardioverted to a normal sinus rhythm. He subsequently had an electrophysiological study at [facility] which was unremarkable. He is followed by an adult cardiologist. 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 Gadiel and his sister were both tested for homocystinuria and were found to be negative.

## 2023-05-10 NOTE — CONSULT LETTER
[Today's Date] : [unfilled] [Name] : Name: [unfilled] [] : : ~~ [Today's Date:] : [unfilled] [Dear  ___:] : Dear Dr. [unfilled]: [Consult] : I had the pleasure of evaluating your patient, [unfilled]. My full evaluation follows. [Consult - Single Provider] : Thank you very much for allowing me to participate in the care of this patient. If you have any questions, please do not hesitate to contact me. [Sincerely,] : Sincerely, [___] : [unfilled] [FreeTextEntry4] : Alan Mackey [FreeTextEntry5] : [address] [facility] [address] [de-identified] : Mercedes Peng MD\par Pediatric Cardiologist\par [facility]\par [address] [place] [address]\par [place], [place] [address]\par Phone: [phone]\par FAX: [phone]\par  [FreeTextEntry6] : [place] [address]

## 2023-05-10 NOTE — CARDIOLOGY SUMMARY
[LVSF ___%] : LV Shortening Fraction [unfilled]% [Today's Date] : [unfilled] [FreeTextEntry1] : An electrocardiogram today shows a normal sinus rhythm (rate=87 bpm). There was a normal axis and left ventricular hypertrophy.  The left ventricular forces are increased compared to the EKG obtained in July 2022.  There was no ectopy on the surface EKG. The measured intervals were normal. [FreeTextEntry2] : A two-dimensional echocardiogram with Doppler evaluation reveals mild hypokinesia of the ventricular septum. The left ventricular ejection fraction by the 5/6*A*L method was normal at 58%. Left ventricular shortening fraction was at normal at 36%. There was no evidence of pulmonary hypertension. Diastolic assessment of LV performance was normal.  No pericardial effusion was seen.  No interval change. [de-identified] : pending\par \par May 17, 2021: This study revealed a predominant normal sinus rhythm at a rate of  bpm. The average heart rate was 82 bpm. One supraventricular ectopic beat was noted. No PVCs were noted.  \par \par July 28, 2020: This study revealed a predominant normal sinus rhythm at a rate of  bpm. The average heart rate was 84 bpm. One supraventricular ectopic beat was noted.  No PVCs were noted.\par \par October 2, 2019: This study revealed a predominant normal sinus rhythm at a rate of  bpm. The average heart rate was 88 bpm. No supraventricular ectopy was noted.  1 PVC was noted which was not clinically significant.\par \par November 14, 2017: This study revealed a predominant normal sinus rhythm at a rate of  bpm. The average heart rate was 79 bpm. No supraventricular or ventricular ectopy was noted.\par \par August 16, 2016: This 24-hour Holter monitor revealed a predominant normal sinus rhythm with a heart rate range of  bpm with an average heart rate of 87 bpm. One isolated premature ventricular contraction was noted.\par  [de-identified] : August 26, 2021 [de-identified] : This cardiac MRI revealed normal left ventricular volumes and a normal left ventricular ejection fraction of 58%. The right ventricular ejection fraction was normal at 61%.  The left ventricular mass was normal.  No regional wall abnormalities were seen. There was no fibrofatty infiltration or edema. No perfusion deficits were identified and there was no evidence of fibrosis on the late gadolinium enhancement sequences. \par \par December 13, 2017:  This cardiac MRI revealed normal left ventricular volumes and a low normal left ventricular ejection fraction of 57%. The right ventricular ejection fraction was normal at 58%. No regional wall abnormalities were seen. There was no fibrofatty infiltration or edema. No perfusion deficits were identified and there was no evidence of fibrosis on the late gadolinium enhancement sequences. [de-identified] : Mid February, 2020 [de-identified] : CBC - WNL\par CMP was notable to mild elevations in AST (64), and ALT (70), seen in patients with muscular dystrophies. No significant change from the LFTs obtained in February of 2018, or October of 2019.  Normal renal function tests.\par Normal lipid profile.  Hemoglobin A1c of 4.7 (within normal limits).  Normal TSH.\par Vitamin D level was 26.6 (reference range 30-80).\par \par October 2, 2019:  Pro-BNP – 7 (0-300),WNL\par CBC - WNL\par CMP was notable to mild elevations in AST (49), and ALT (99), seen in patients with muscular dystrophies. No significant change from the LFTs obtained in February of 2018.\par \par February 6, 2018:  CBC and pro-BNP - WNL\par CMP was notable to mild elevations in AST and ALT (seen in patients with muscular dystrophies)

## 2023-05-10 NOTE — REASON FOR VISIT
[Follow-Up] : a follow-up visit for [Patient] : patient [Mother] : mother [FreeTextEntry1] : Hinson's muscular dystrophy

## 2023-05-10 NOTE — PHYSICAL EXAM
[Apical Impulse] : quiet precordium with normal apical impulse [Heart Sounds] : normal S1 and S2 [Heart Rate And Rhythm] : normal heart rate and rhythm [No Murmur] : no murmurs  [Heart Sounds Gallop] : no gallops [Heart Sounds Pericardial Friction Rub] : no pericardial rub [Arterial Pulses] : normal upper and lower extremity pulses with no pulse delay [Heart Sounds Click] : no clicks [Edema] : no edema [Capillary Refill Test] : normal capillary refill [No Diastolic Murmur] : no diastolic murmur was heard [Skin Lesions] : no lesions [Nail Clubbing] : no clubbing  or cyanosis of the fingers [General Appearance - Alert] : alert [General Appearance - In No Acute Distress] : in no acute distress [General Appearance - Well Developed] : well developed [General Appearance - Well Nourished] : well nourished [General Appearance - Well-Appearing] : well appearing [Attitude Uncooperative] : cooperative [Facies] : there were no dysmorphic facial features [Sclera] : the sclera were normal [Auscultation Breath Sounds / Voice Sounds] : breath sounds clear to auscultation bilaterally [Respiration, Rhythm And Depth] : normal respiratory rhythm and effort [No Cough] : no cough [Normal Chest Appearance] : the chest was normal in appearance [Chest Visual Inspection Thoracic Deformity] : no chest wall deformity [Abdomen Soft] : soft [Abdomen Tenderness] : non-tender [] : no hepato-splenomegaly [Demonstrated Behavior - Infant Nonreactive To Parents] : interactive [Demonstrated Behavior] : normal behavior [Appropriate] : appropriate [FreeTextEntry1] : Delightful, engaging young man

## 2023-05-12 ENCOUNTER — APPOINTMENT (OUTPATIENT)
Dept: PEDIATRIC CARDIOLOGY | Facility: CLINIC | Age: 23
End: 2023-05-12
Payer: COMMERCIAL

## 2023-05-12 PROCEDURE — 93224 XTRNL ECG REC UP TO 48 HRS: CPT

## 2024-04-30 ENCOUNTER — RESULT CHARGE (OUTPATIENT)
Age: 24
End: 2024-04-30

## 2024-05-01 ENCOUNTER — APPOINTMENT (OUTPATIENT)
Dept: PEDIATRIC CARDIOLOGY | Facility: CLINIC | Age: 24
End: 2024-05-01
Payer: COMMERCIAL

## 2024-05-01 VITALS
DIASTOLIC BLOOD PRESSURE: 68 MMHG | WEIGHT: 185.63 LBS | HEART RATE: 74 BPM | BODY MASS INDEX: 25.7 KG/M2 | RESPIRATION RATE: 20 BRPM | HEIGHT: 71.26 IN | OXYGEN SATURATION: 98 % | SYSTOLIC BLOOD PRESSURE: 118 MMHG

## 2024-05-01 PROCEDURE — 99204 OFFICE O/P NEW MOD 45 MIN: CPT

## 2024-05-01 PROCEDURE — G2211 COMPLEX E/M VISIT ADD ON: CPT

## 2024-05-01 PROCEDURE — 93306 TTE W/DOPPLER COMPLETE: CPT

## 2024-05-01 RX ORDER — AMOXICILLIN AND CLAVULANATE POTASSIUM 875; 125 MG/1; MG/1
875-125 TABLET, COATED ORAL
Qty: 6 | Refills: 0 | Status: DISCONTINUED | COMMUNITY
Start: 2022-07-13 | End: 2024-05-01

## 2024-05-01 RX ORDER — MULTIVITAMIN
TABLET ORAL
Refills: 0 | Status: DISCONTINUED | COMMUNITY
End: 2024-05-01

## 2024-05-01 NOTE — CONSULT LETTER
[Today's Date] : [unfilled] [Name] : Name: [unfilled] [] : : ~~ [Today's Date:] : [unfilled] [Dear  ___:] : Dear Dr. [unfilled]: [Consult] : I had the pleasure of evaluating your patient, [unfilled]. My full evaluation follows. [Consult - Single Provider] : Thank you very much for allowing me to participate in the care of this patient. If you have any questions, please do not hesitate to contact me. [Sincerely,] : Sincerely, [___] : [unfilled] [FreeTextEntry4] : Alan Mackey [FreeTextEntry5] : [address] [facility] [address] [FreeTextEntry6] : [place] [address] [de-identified] : Mercedes Peng MD\par  Pediatric Cardiologist\par  [facility]\par  [address] [place] [address]\par  [place], [place] [address]\par  Phone: [phone]\par  FAX: [phone]\par

## 2024-05-01 NOTE — HISTORY OF PRESENT ILLNESS
[FreeTextEntry1] : Gadiel was evaluated at the [facility] today.  As you are aware, Gadiel is a 23 year year old with a history of Hinson's muscular dystrophy.    He denies chest pain, shortness of breath, palpitations, dizziness, syncope, swelling of leg, or PND.  He can walk a flight of stairs without difficulty. He exercises by walking the golf course.  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   Gadiel was accompanied to the office visit today by his mother.    In November of 2017, aGdiel had mild hypokinesia of the posterior wall and septal ventricular wall. His left ventricular ejection fraction was mildly decreased at 52% compared to 62% the year prior. Gadiel also has had a cardiac MRI in December of 2017 which showed no evidence of a significant cardiomyopathy and a low normal left ventricular ejection fraction of 57%. He has had a normal Holter monitor, in November of 2017. In February of 2018, Gadiel was started on lisinopril 10 mg once daily as a remodeling medication for his mild cardiomyopathy and for better blood pressure control. In February of 2019, the dose of lisinopril was increased to 20 mg once daily. He is tolerating this medication well with no adverse effects.  He was evaluated by Dr. Stone in pediatric nephrology on 12/6/2017. Gadiel had an 24-hour ambulatory blood pressure monitor. This study found him to be in the "pre-hypertensive" range.  Gadiel has been maintained on lisinopril with acceptable blood pressures.  In the past, Gadiel had been followed closely by Dr. Kwabena Atwood of neurology for his muscular dystrophy.  In the spring 2022,, his neurological care was transitioned to the muscular dystrophy clinic at [facility], under the care of Dr. Michael Salcido. He has a follow-up visit in this muscular dystrophy clinic recently and he has had no significant progression of his muscular dystrophy.   Gadiel's mother is adopted and her family history is unknown. There is no other known family history of muscular dystrophy. He has sister who has not had genetic testing performed for BMD.  Gadiel's 22-year-old brother, Inderjit, is negative for Hinson.  He has recurrent episodes of atrial fibrillation (diagnosed in February of 2016). 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 Gadiel and his sister were both tested for homocystinuria and were found to be negative.

## 2024-05-01 NOTE — CARDIOLOGY SUMMARY
[de-identified] : Mid February, 2020 [de-identified] : CBC - WNL\par  CMP was notable to mild elevations in AST (64), and ALT (70), seen in patients with muscular dystrophies. No significant change from the LFTs obtained in February of 2018, or October of 2019.  Normal renal function tests.\par  Normal lipid profile.  Hemoglobin A1c of 4.7 (within normal limits).  Normal TSH.\par  Vitamin D level was 26.6 (reference range 30-80).\par  \par  October 2, 2019:  Pro-BNP - 7 (0-300),WNL\par  CBC - WNL\par  CMP was notable to mild elevations in AST (49), and ALT (99), seen in patients with muscular dystrophies. No significant change from the LFTs obtained in February of 2018.\par  \par  February 6, 2018:  CBC and pro-BNP - WNL\par  CMP was notable to mild elevations in AST and ALT (seen in patients with muscular dystrophies) [LVSF ___%] : LV Shortening Fraction [unfilled]% [Today's Date] : [unfilled] [FreeTextEntry1] : normal sinus rhythm (rate=71bpm). There was a normal axis and left ventricular hypertrophy.  J point elevation. There was no ectopy on the surface EKG. The measured intervals were normal. [FreeTextEntry2] : 1. Follow up study mainly to evaluate myocardial function. 2. Hinson's Muscular Dystrophy. 3. Normal right ventricular morphology with qualitatively normal size and systolic function. 4. Normal left ventricular size, morphology and systolic function. 5. No pericardial effusion. 6. There has been no significant interval change.  [de-identified] : May 8, 2023: Normal holter  May 17, 2021: This study revealed a predominant normal sinus rhythm at a rate of  bpm. The average heart rate was 82 bpm. One supraventricular ectopic beat was noted. No PVCs were noted.    July 28, 2020: This study revealed a predominant normal sinus rhythm at a rate of  bpm. The average heart rate was 84 bpm. One supraventricular ectopic beat was noted.  No PVCs were noted.  October 2, 2019: This study revealed a predominant normal sinus rhythm at a rate of  bpm. The average heart rate was 88 bpm. No supraventricular ectopy was noted.  1 PVC was noted which was not clinically significant.  November 14, 2017: This study revealed a predominant normal sinus rhythm at a rate of  bpm. The average heart rate was 79 bpm. No supraventricular or ventricular ectopy was noted.  August 16, 2016: This 24-hour Holter monitor revealed a predominant normal sinus rhythm with a heart rate range of  bpm with an average heart rate of 87 bpm. One isolated premature ventricular contraction was noted.  [de-identified] : August 26, 2021 [de-identified] : Normal left ventricular volumes (LVEDVi: 94.8mL/m2; z: 0.85 ) with normal systolic function (LVEF: 58.3%; z: -1.28). Normal right ventricular volumes (RVEDVi: 95.1 mL/m2; z: 0.63) with normal systolic function (RVEF: 61.2%; z: 1.65). No regional wall motion abnormalities. Negative resting first pass perfusion. No fibrofatty infiltration or edema or hyperemia. No evidence of fibrosis on late gadolinium enhancement sequences.  December 13, 2017:  This cardiac MRI revealed normal left ventricular volumes and a low normal left ventricular ejection fraction of 57%. The right ventricular ejection fraction was normal at 58%. No regional wall abnormalities were seen. There was no fibrofatty infiltration or edema. No perfusion deficits were identified and there was no evidence of fibrosis on the late gadolinium enhancement sequences.

## 2024-05-01 NOTE — PHYSICAL EXAM
[Apical Impulse] : quiet precordium with normal apical impulse [Heart Rate And Rhythm] : normal heart rate and rhythm [Heart Sounds Gallop] : no gallops [Heart Sounds Pericardial Friction Rub] : no pericardial rub [Arterial Pulses] : normal upper and lower extremity pulses with no pulse delay [Edema] : no edema [Capillary Refill Test] : normal capillary refill [No Diastolic Murmur] : no diastolic murmur was heard [Skin Lesions] : no lesions [General Appearance - In No Acute Distress] : in no acute distress [General Appearance - Well Developed] : well developed [Attitude Uncooperative] : cooperative [Facies] : there were no dysmorphic facial features [Sclera] : the conjunctiva were normal [No Cough] : no cough [Normal Chest Appearance] : the chest was normal in appearance [Abdomen Tenderness] : non-tender [Demonstrated Behavior - Infant Nonreactive To Parents] : interactive [Demonstrated Behavior] : normal behavior [Appropriate] : appropriate [General Appearance - Alert] : alert [General Appearance - Well Nourished] : well nourished [General Appearance - Well-Appearing] : well appearing [] : no respiratory distress [Auscultation Breath Sounds / Voice Sounds] : breath sounds clear to auscultation bilaterally [Respiration, Rhythm And Depth] : normal respiratory rhythm and effort [Chest Visual Inspection Thoracic Deformity] : no chest wall deformity [Heart Sounds] : normal S1 and S2 [No Murmur] : no murmurs  [Heart Sounds Click] : no clicks [Abdomen Soft] : soft [Nail Clubbing] : no clubbing  or cyanosis of the fingernails [FreeTextEntry1] : pseudohypertrophy of bilateral calf muscles and bilateral biceps; mild contractures of  Achiles tendon

## 2024-05-01 NOTE — HISTORY OF PRESENT ILLNESS
[FreeTextEntry1] : Gadiel was evaluated at the [facility] today.  As you are aware, Gadiel is a 23 year year old with a history of Hinson's muscular dystrophy.    He denies chest pain, shortness of breath, palpitations, dizziness, syncope, swelling of leg, or PND.  He can walk a flight of stairs without difficulty. He exercises by walking the golf course.  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   Gadiel was accompanied to the office visit today by his mother.    In November of 2017, Gadiel had mild hypokinesia of the posterior wall and septal ventricular wall. His left ventricular ejection fraction was mildly decreased at 52% compared to 62% the year prior. Gadiel also has had a cardiac MRI in December of 2017 which showed no evidence of a significant cardiomyopathy and a low normal left ventricular ejection fraction of 57%. He has had a normal Holter monitor, in November of 2017. In February of 2018, Gadiel was started on lisinopril 10 mg once daily as a remodeling medication for his mild cardiomyopathy and for better blood pressure control. In February of 2019, the dose of lisinopril was increased to 20 mg once daily. He is tolerating this medication well with no adverse effects.  He was evaluated by Dr. Stone in pediatric nephrology on 12/6/2017. Gadiel had an 24-hour ambulatory blood pressure monitor. This study found him to be in the "pre-hypertensive" range.  Gadiel has been maintained on lisinopril with acceptable blood pressures.  In the past, Gadiel had been followed closely by Dr. Kwabena Atwood of neurology for his muscular dystrophy.  In the spring 2022,, his neurological care was transitioned to the muscular dystrophy clinic at [facility], under the care of Dr. Michael Salcido. He has a follow-up visit in this muscular dystrophy clinic recently and he has had no significant progression of his muscular dystrophy.   Gadiel's mother is adopted and her family history is unknown. There is no other known family history of muscular dystrophy. He has sister who has not had genetic testing performed for BMD.  Gadiel's 22-year-old brother, Inderjit, is negative for Hinson.  He has recurrent episodes of atrial fibrillation (diagnosed in February of 2016). 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 Gadiel and his sister were both tested for homocystinuria and were found to be negative.

## 2024-05-01 NOTE — CONSULT LETTER
[Today's Date] : [unfilled] [Name] : Name: [unfilled] [] : : ~~ [Today's Date:] : [unfilled] [Dear  ___:] : Dear Dr. [unfilled]: [Consult] : I had the pleasure of evaluating your patient, [unfilled]. My full evaluation follows. [Consult - Single Provider] : Thank you very much for allowing me to participate in the care of this patient. If you have any questions, please do not hesitate to contact me. [Sincerely,] : Sincerely, [___] : [unfilled] [FreeTextEntry4] : Alan Mackey [FreeTextEntry5] : [address] [facility] [address] [FreeTextEntry6] : [place] [address] [de-identified] : Mercedes Peng MD\par  Pediatric Cardiologist\par  [facility]\par  [address] [place] [address]\par  [place], [place] [address]\par  Phone: [phone]\par  FAX: [phone]\par

## 2024-05-01 NOTE — CARDIOLOGY SUMMARY
[de-identified] : Mid February, 2020 [de-identified] : CBC - WNL\par  CMP was notable to mild elevations in AST (64), and ALT (70), seen in patients with muscular dystrophies. No significant change from the LFTs obtained in February of 2018, or October of 2019.  Normal renal function tests.\par  Normal lipid profile.  Hemoglobin A1c of 4.7 (within normal limits).  Normal TSH.\par  Vitamin D level was 26.6 (reference range 30-80).\par  \par  October 2, 2019:  Pro-BNP - 7 (0-300),WNL\par  CBC - WNL\par  CMP was notable to mild elevations in AST (49), and ALT (99), seen in patients with muscular dystrophies. No significant change from the LFTs obtained in February of 2018.\par  \par  February 6, 2018:  CBC and pro-BNP - WNL\par  CMP was notable to mild elevations in AST and ALT (seen in patients with muscular dystrophies) [LVSF ___%] : LV Shortening Fraction [unfilled]% [Today's Date] : [unfilled] [FreeTextEntry1] : normal sinus rhythm (rate=71bpm). There was a normal axis and left ventricular hypertrophy.  J point elevation. There was no ectopy on the surface EKG. The measured intervals were normal. [FreeTextEntry2] : 1. Follow up study mainly to evaluate myocardial function. 2. Hinson's Muscular Dystrophy. 3. Normal right ventricular morphology with qualitatively normal size and systolic function. 4. Normal left ventricular size, morphology and systolic function. 5. No pericardial effusion. 6. There has been no significant interval change.  [de-identified] : May 8, 2023: Normal holter  May 17, 2021: This study revealed a predominant normal sinus rhythm at a rate of  bpm. The average heart rate was 82 bpm. One supraventricular ectopic beat was noted. No PVCs were noted.    July 28, 2020: This study revealed a predominant normal sinus rhythm at a rate of  bpm. The average heart rate was 84 bpm. One supraventricular ectopic beat was noted.  No PVCs were noted.  October 2, 2019: This study revealed a predominant normal sinus rhythm at a rate of  bpm. The average heart rate was 88 bpm. No supraventricular ectopy was noted.  1 PVC was noted which was not clinically significant.  November 14, 2017: This study revealed a predominant normal sinus rhythm at a rate of  bpm. The average heart rate was 79 bpm. No supraventricular or ventricular ectopy was noted.  August 16, 2016: This 24-hour Holter monitor revealed a predominant normal sinus rhythm with a heart rate range of  bpm with an average heart rate of 87 bpm. One isolated premature ventricular contraction was noted.  [de-identified] : August 26, 2021 [de-identified] : Normal left ventricular volumes (LVEDVi: 94.8mL/m2; z: 0.85 ) with normal systolic function (LVEF: 58.3%; z: -1.28). Normal right ventricular volumes (RVEDVi: 95.1 mL/m2; z: 0.63) with normal systolic function (RVEF: 61.2%; z: 1.65). No regional wall motion abnormalities. Negative resting first pass perfusion. No fibrofatty infiltration or edema or hyperemia. No evidence of fibrosis on late gadolinium enhancement sequences.  December 13, 2017:  This cardiac MRI revealed normal left ventricular volumes and a low normal left ventricular ejection fraction of 57%. The right ventricular ejection fraction was normal at 58%. No regional wall abnormalities were seen. There was no fibrofatty infiltration or edema. No perfusion deficits were identified and there was no evidence of fibrosis on the late gadolinium enhancement sequences.

## 2024-11-20 ENCOUNTER — RX RENEWAL (OUTPATIENT)
Age: 24
End: 2024-11-20

## 2024-11-20 RX ORDER — LISINOPRIL 20 MG/1
20 TABLET ORAL
Qty: 90 | Refills: 3 | Status: ACTIVE | COMMUNITY
Start: 2024-11-20 | End: 1900-01-01

## 2024-12-19 ENCOUNTER — OFFICE (OUTPATIENT)
Dept: URBAN - METROPOLITAN AREA CLINIC 115 | Facility: CLINIC | Age: 24
Setting detail: OPHTHALMOLOGY
End: 2024-12-19
Payer: COMMERCIAL

## 2024-12-19 DIAGNOSIS — H50.52: ICD-10-CM

## 2024-12-19 DIAGNOSIS — H52.13: ICD-10-CM

## 2024-12-19 PROCEDURE — 92014 COMPRE OPH EXAM EST PT 1/>: CPT | Performed by: OPHTHALMOLOGY

## 2024-12-19 PROCEDURE — 92015 DETERMINE REFRACTIVE STATE: CPT | Performed by: OPHTHALMOLOGY

## 2024-12-19 ASSESSMENT — REFRACTION_MANIFEST
OD_SPHERE: -0.75
OS_SPHERE: -0.25
OS_AXIS: 180
OD_AXIS: 180
OS_VA1: 20/20
OD_AXIS: 180
OD_CYLINDER: -0.50
OD_VA1: 20/20
OS_CYLINDER: -0.25
OS_AXIS: 078
OS_SPHERE: -0.75
OD_VA1: 20/20
OS_CYLINDER: -0.25
OD_SPHERE: -0.75
OS_VA1: 20/20
OS_SPHERE: -1.00
OD_VA1: 20/20
OD_AXIS: 068
OS_VA1: 20/20
OD_CYLINDER: -0.75
OD_SPHERE: PLANO
OD_CYLINDER: -0.25

## 2024-12-19 ASSESSMENT — REFRACTION_CURRENTRX
OS_SPHERE: -0.75
OD_AXIS: 004
OS_AXIS: 051
OD_OVR_VA: 20/
OS_OVR_VA: 20/
OD_VPRISM_DIRECTION: SV
OD_VPRISM_DIRECTION: SV
OS_AXIS: 180
OS_VPRISM_DIRECTION: SV
OD_OVR_VA: 20/
OD_CYLINDER: -0.50
OS_VPRISM_DIRECTION: SV
OD_CYLINDER: -0.50
OS_OVR_VA: 20/
OS_CYLINDER: -0.25
OD_SPHERE: -0.75
OS_SPHERE: -0.75
OD_SPHERE: -0.75
OS_CYLINDER: 0.00
OD_AXIS: 007

## 2024-12-19 ASSESSMENT — REFRACTION_AUTOREFRACTION
OS_AXIS: 000
OD_AXIS: 172
OD_SPHERE: -1.00
OS_CYLINDER: 0.00
OD_CYLINDER: -0.75
OS_SPHERE: -1.00

## 2024-12-19 ASSESSMENT — CONFRONTATIONAL VISUAL FIELD TEST (CVF)
OD_FINDINGS: FULL
OS_FINDINGS: FULL

## 2024-12-19 ASSESSMENT — TONOMETRY
OD_IOP_MMHG: 15
OS_IOP_MMHG: 13

## 2024-12-19 ASSESSMENT — VISUAL ACUITY
OS_BCVA: 20/25-1
OD_BCVA: 20/20-

## 2024-12-19 ASSESSMENT — LID EXAM ASSESSMENTS
OD_BLEPHARITIS: RUL T
OS_BLEPHARITIS: LUL T

## 2025-05-02 ENCOUNTER — RESULT CHARGE (OUTPATIENT)
Age: 25
End: 2025-05-02

## 2025-05-05 ENCOUNTER — APPOINTMENT (OUTPATIENT)
Dept: PEDIATRIC CARDIOLOGY | Facility: CLINIC | Age: 25
End: 2025-05-05
Payer: COMMERCIAL

## 2025-05-05 VITALS
SYSTOLIC BLOOD PRESSURE: 137 MMHG | OXYGEN SATURATION: 99 % | WEIGHT: 200.62 LBS | BODY MASS INDEX: 27.78 KG/M2 | DIASTOLIC BLOOD PRESSURE: 83 MMHG | HEIGHT: 71.26 IN | HEART RATE: 74 BPM

## 2025-05-05 VITALS — DIASTOLIC BLOOD PRESSURE: 80 MMHG | SYSTOLIC BLOOD PRESSURE: 123 MMHG

## 2025-05-05 DIAGNOSIS — G71.01 DUCHENNE OR BECKER MUSCULAR DYSTROPHY: ICD-10-CM

## 2025-05-05 DIAGNOSIS — Z91.89 OTHER SPECIFIED PERSONAL RISK FACTORS, NOT ELSEWHERE CLASSIFIED: ICD-10-CM

## 2025-05-05 PROCEDURE — 93306 TTE W/DOPPLER COMPLETE: CPT

## 2025-05-05 PROCEDURE — 99214 OFFICE O/P EST MOD 30 MIN: CPT

## 2025-05-05 PROCEDURE — 93356 MYOCRD STRAIN IMG SPCKL TRCK: CPT | Mod: 26
